# Patient Record
Sex: FEMALE | Race: WHITE | URBAN - METROPOLITAN AREA
[De-identification: names, ages, dates, MRNs, and addresses within clinical notes are randomized per-mention and may not be internally consistent; named-entity substitution may affect disease eponyms.]

---

## 2020-06-24 ENCOUNTER — IMPORTED ENCOUNTER (OUTPATIENT)
Dept: URBAN - METROPOLITAN AREA CLINIC 50 | Facility: CLINIC | Age: 65
End: 2020-06-24

## 2020-06-24 NOTE — PATIENT DISCUSSION
"""Removal of corneal foreign body OS with swab done today with patient written consent."" ""Start Erythromycin Ointment left eye twice a day

## 2020-06-24 NOTE — PATIENT DISCUSSION
""" Informed patient that their cataract is visually significant and meets the criteria for cataract ""

## 2020-06-25 ENCOUNTER — IMPORTED ENCOUNTER (OUTPATIENT)
Dept: URBAN - METROPOLITAN AREA CLINIC 50 | Facility: CLINIC | Age: 65
End: 2020-06-25

## 2020-06-30 ENCOUNTER — IMPORTED ENCOUNTER (OUTPATIENT)
Dept: URBAN - METROPOLITAN AREA CLINIC 50 | Facility: CLINIC | Age: 65
End: 2020-06-30

## 2020-07-16 ENCOUNTER — IMPORTED ENCOUNTER (OUTPATIENT)
Dept: URBAN - METROPOLITAN AREA CLINIC 50 | Facility: CLINIC | Age: 65
End: 2020-07-16

## 2020-07-28 ENCOUNTER — IMPORTED ENCOUNTER (OUTPATIENT)
Dept: URBAN - METROPOLITAN AREA CLINIC 50 | Facility: CLINIC | Age: 65
End: 2020-07-28

## 2020-07-28 NOTE — PATIENT DISCUSSION
"""S/P IOL OS: Sensar AAB00 +20.0 (Target: Distance) +Omidria.  Continue post operative instructions and drops per schedule. "" ""Increase Pred-Moxi-Nepaf left eye three times a day

## 2020-08-06 ENCOUNTER — IMPORTED ENCOUNTER (OUTPATIENT)
Dept: URBAN - METROPOLITAN AREA CLINIC 50 | Facility: CLINIC | Age: 65
End: 2020-08-06

## 2020-08-06 NOTE — PATIENT DISCUSSION
"""S/P IOL OS: Sensar AAB00 +20.0 (Target: Distance) +Omidria.  Continue post operative instructions and drops per schedule. "" ""Continue Pred-Moxi-Nepaf left eye three times a day

## 2020-08-11 ENCOUNTER — IMPORTED ENCOUNTER (OUTPATIENT)
Dept: URBAN - METROPOLITAN AREA CLINIC 50 | Facility: CLINIC | Age: 65
End: 2020-08-11

## 2020-08-11 PROBLEM — Z96.1: Noted: 2020-07-28

## 2020-08-11 PROBLEM — Z96.1: Noted: 2020-08-11

## 2020-08-11 NOTE — PATIENT DISCUSSION
"""S/P IOL OD: Sensar AAB00 20.00 +Omidria. Continue post operative instructions and drops per schedule.  """

## 2020-08-20 ENCOUNTER — IMPORTED ENCOUNTER (OUTPATIENT)
Dept: URBAN - METROPOLITAN AREA CLINIC 50 | Facility: CLINIC | Age: 65
End: 2020-08-20

## 2020-09-10 ENCOUNTER — IMPORTED ENCOUNTER (OUTPATIENT)
Dept: URBAN - METROPOLITAN AREA CLINIC 50 | Facility: CLINIC | Age: 65
End: 2020-09-10

## 2021-04-13 ENCOUNTER — IMPORTED ENCOUNTER (OUTPATIENT)
Dept: URBAN - METROPOLITAN AREA CLINIC 50 | Facility: CLINIC | Age: 66
End: 2021-04-13

## 2021-04-13 NOTE — PATIENT DISCUSSION
"""based on large optic nerves recommend hvf/oct rnfl and pachy in 1 month with a return visit to ""

## 2021-04-17 ASSESSMENT — VISUAL ACUITY
OD_OTHER: 20/40. 20/50.
OS_SC: 20/30
OD_SC: 20/50+
OD_OTHER: 20/80. 20/400.
OS_PH: 20/30
OS_PH: 20/30+1
OS_SC: 20/40
OD_SC: 20/25
OS_BAT: 20/80
OD_BAT: 20/40
OS_SC: 20/40
OD_CC: 20/25
OS_OTHER: 20/80. 20/100.
OD_BAT: 20/80
OS_BAT: 20/80
OS_PH: 20/25
OS_CC: 20/40
OS_CC: J3
OD_CC: J1+@ 16 IN
OS_CC: 20/80
OD_BAT: 20/80
OD_SC: 20/25
OS_BAT: 20/40
OS_OTHER: 20/80. 20/80.
OS_CC: 20/40
OD_SC: 20/60+2
OS_SC: 20/40-1
OD_SC: 20/60
OD_PH: 20/30
OD_CC: J3
OS_OTHER: 20/40. 20/50.
OD_OTHER: 20/80. 20/400.
OD_CC: 20/25
OD_BAT: 20/80
OS_CC: J1+@ 16 IN
OS_BAT: 20/80
OD_OTHER: 20/80. 20/400.
OS_OTHER: 20/80. 20/80.
OD_CC: J5
OS_CC: J5

## 2021-04-17 ASSESSMENT — TONOMETRY
OS_IOP_MMHG: 28
OD_IOP_MMHG: 14
OS_IOP_MMHG: 14
OD_IOP_MMHG: 15
OD_IOP_MMHG: 14
OD_IOP_MMHG: 20
OS_IOP_MMHG: 15
OD_IOP_MMHG: 29
OS_IOP_MMHG: 14
OS_IOP_MMHG: 16
OD_IOP_MMHG: 15
OD_IOP_MMHG: 15
OD_IOP_MMHG: 16
OS_IOP_MMHG: 14
OS_IOP_MMHG: 13

## 2021-05-06 ENCOUNTER — PREPPED CHART (OUTPATIENT)
Dept: URBAN - METROPOLITAN AREA CLINIC 53 | Facility: CLINIC | Age: 66
End: 2021-05-06

## 2021-05-11 ENCOUNTER — FOLLOW UP (OUTPATIENT)
Dept: URBAN - METROPOLITAN AREA CLINIC 53 | Facility: CLINIC | Age: 66
End: 2021-05-11

## 2021-05-11 DIAGNOSIS — H40.013: ICD-10-CM

## 2021-05-11 PROCEDURE — 92020 GONIOSCOPY: CPT

## 2021-05-11 PROCEDURE — 92083 EXTENDED VISUAL FIELD XM: CPT

## 2021-05-11 PROCEDURE — 92012 INTRM OPH EXAM EST PATIENT: CPT

## 2021-05-11 PROCEDURE — 92133 CPTRZD OPH DX IMG PST SGM ON: CPT

## 2021-05-11 PROCEDURE — 76514 ECHO EXAM OF EYE THICKNESS: CPT

## 2021-05-11 ASSESSMENT — VISUAL ACUITY
OD_SC: 20/30
OS_PH: 20/25
OD_PH: 20/25
OS_SC: 20/30

## 2021-05-11 ASSESSMENT — TONOMETRY
OD_IOP_MMHG: 14
OD_IOP_MMHG: 14
OS_IOP_MMHG: 14
OS_IOP_MMHG: 14

## 2021-05-11 NOTE — PATIENT DISCUSSION
Testing reviewed with patient today. IOP stable OU. No drop therapy recommended at this time. Schedule HVF/RNFL OCT prior to annual appt.

## 2022-05-11 ENCOUNTER — DIAGNOSTICS ONLY (OUTPATIENT)
Dept: URBAN - METROPOLITAN AREA CLINIC 53 | Facility: CLINIC | Age: 67
End: 2022-05-11

## 2022-05-11 DIAGNOSIS — H40.013: ICD-10-CM

## 2022-05-11 PROCEDURE — 92083 EXTENDED VISUAL FIELD XM: CPT

## 2022-05-11 PROCEDURE — 92133 CPTRZD OPH DX IMG PST SGM ON: CPT

## 2022-05-11 NOTE — PATIENT DISCUSSION
GLAUCOMA SUSPECT-C/D: The patient is a glaucoma suspect because of suspicious appearance of the optic disc(s). Periodic follow-ups with visual field testing, optic nerve imaging and nerve fiber studies are essential. Mild VF change OS, will monitor iop closely.

## 2022-12-27 ENCOUNTER — COMPREHENSIVE EXAM (OUTPATIENT)
Dept: URBAN - METROPOLITAN AREA CLINIC 53 | Facility: CLINIC | Age: 67
End: 2022-12-27

## 2022-12-27 PROCEDURE — 92134 CPTRZ OPH DX IMG PST SGM RTA: CPT

## 2022-12-27 PROCEDURE — 66821 AFTER CATARACT LASER SURGERY: CPT

## 2022-12-27 PROCEDURE — 92014 COMPRE OPH EXAM EST PT 1/>: CPT

## 2022-12-27 RX ORDER — POVIDONE 2.5 MG/.5G
SOLUTION, GEL FORMING / DROPS OPHTHALMIC
Start: 2022-12-27

## 2022-12-27 RX ORDER — LATANOPROST 50 UG/ML
1 SOLUTION/ DROPS OPHTHALMIC EVERY EVENING
Start: 2022-12-27

## 2022-12-27 ASSESSMENT — TONOMETRY
OD_IOP_MMHG: 14
OS_IOP_MMHG: 14

## 2022-12-27 ASSESSMENT — VISUAL ACUITY
OS_GLARE: 20/70
OD_GLARE: 20/40
OD_SC: 20/60
OU_CC: J3 @ 14 IN
OS_PH: 20/50
OS_SC: 20/70
OD_PH: 20/40

## 2022-12-27 NOTE — PATIENT DISCUSSION
HVF/RNFL done 5/2022 reviewed with patient in office today. IOP 14 adjust to 14. Discussed with patient to drop therapy needed at this time.

## 2022-12-27 NOTE — PROCEDURE NOTE: CLINICAL
PROCEDURE NOTE: YAG Capsulotomy OS. Diagnosis: Posterior Capsular Opacification (PCO). Prep: Mydriacil 1% and Phenylephrine 2.5%. Prior to treatment, the risks/benefits/alternatives were discussed. The patient wished to proceed with procedure. Consent was signed. Proparacaine and brominidine were placed into the operative eye after the eye was dilated. Power = 1.3mJ. Number of pulses = 36. Patient tolerated procedure well and there were no complications. Post Laser instructions given. Franck Dill

## 2022-12-27 NOTE — PATIENT DISCUSSION
HVF/RNFL done 5/2022 reviewed with patient in office today. IOP 14 adjust to 14. Discussed with patient to start Latanoprost OS Qhs. RTC in 3 months for IOP check.

## 2022-12-27 NOTE — PATIENT DISCUSSION
PCO ([id number]): Visually significant PCO present on exam today. Recommend YAG laser capsulotomy to improve vision and decrease glare symptoms. RBAs of procedure discussed. Patient agrees and wishes to proceed.

## 2023-01-19 ENCOUNTER — POST-OP (OUTPATIENT)
Dept: URBAN - METROPOLITAN AREA CLINIC 53 | Facility: CLINIC | Age: 68
End: 2023-01-19

## 2023-01-19 DIAGNOSIS — H43.813: ICD-10-CM

## 2023-01-19 DIAGNOSIS — Z98.890: ICD-10-CM

## 2023-01-19 PROCEDURE — 92134 CPTRZ OPH DX IMG PST SGM RTA: CPT

## 2023-01-19 ASSESSMENT — VISUAL ACUITY
OD_GLARE: 20/40
OS_SC: 20/60
OD_SC: 20/50
OD_PH: 20/30
OD_GLARE: 20/40
OS_PH: 20/40

## 2023-01-19 ASSESSMENT — TONOMETRY
OD_IOP_MMHG: 16
OS_IOP_MMHG: 14

## 2023-01-19 NOTE — PATIENT DISCUSSION
HVF/RNFL done 5/2022 reviewed with patient in office today. IOP 14 OS. Patient was started on Latanoprost OS Qhs at last visit with Dr. David Vegas. Dr. David Vegas had recommended 3 month IOP check. RTC in 2-3 months for IOP check.

## 2023-01-19 NOTE — PATIENT DISCUSSION
HVF/RNFL done 5/2022 reviewed with patient in office today. IOP 16 OD. Discussed with patient to drop therapy needed at this time.

## 2023-08-01 ENCOUNTER — FOLLOW UP (OUTPATIENT)
Dept: URBAN - METROPOLITAN AREA CLINIC 53 | Facility: CLINIC | Age: 68
End: 2023-08-01

## 2023-08-01 DIAGNOSIS — H40.1121: ICD-10-CM

## 2023-08-01 PROCEDURE — 99213 OFFICE O/P EST LOW 20 MIN: CPT

## 2023-08-01 ASSESSMENT — TONOMETRY
OD_IOP_MMHG: 16
OD_IOP_MMHG: 16
OS_IOP_MMHG: 15
OS_IOP_MMHG: 15

## 2023-08-01 ASSESSMENT — VISUAL ACUITY
OS_SC: 20/50
OD_SC: 20/50

## 2024-07-23 ENCOUNTER — COMPREHENSIVE EXAM (OUTPATIENT)
Dept: URBAN - METROPOLITAN AREA CLINIC 53 | Facility: CLINIC | Age: 69
End: 2024-07-23

## 2024-07-23 DIAGNOSIS — H52.4: ICD-10-CM

## 2024-07-23 DIAGNOSIS — H40.011: ICD-10-CM

## 2024-07-23 DIAGNOSIS — E11.9: ICD-10-CM

## 2024-07-23 DIAGNOSIS — H04.123: ICD-10-CM

## 2024-07-23 DIAGNOSIS — H43.813: ICD-10-CM

## 2024-07-23 DIAGNOSIS — H26.491: ICD-10-CM

## 2024-07-23 DIAGNOSIS — H40.1121: ICD-10-CM

## 2024-07-23 PROCEDURE — 99214 OFFICE O/P EST MOD 30 MIN: CPT

## 2024-07-23 PROCEDURE — 92083 EXTENDED VISUAL FIELD XM: CPT

## 2024-07-23 PROCEDURE — 92015 DETERMINE REFRACTIVE STATE: CPT

## 2024-07-23 PROCEDURE — 92133 CPTRZD OPH DX IMG PST SGM ON: CPT

## 2024-07-23 ASSESSMENT — VISUAL ACUITY
OS_PH: 20/50-1
OU_CC: J1
OD_GLARE: 20/40
OD_GLARE: 20/50
OD_PH: 20/30
OD_SC: 20/50-1
OS_SC: 20/60

## 2024-07-23 ASSESSMENT — TONOMETRY
OS_IOP_MMHG: 16
OS_IOP_MMHG: 16
OD_IOP_MMHG: 16
OD_IOP_MMHG: 16